# Patient Record
(demographics unavailable — no encounter records)

---

## 2025-04-15 NOTE — PROCEDURE
[TextEntry] : Excision Operative Note   Indications:  Alternative therapies were discussed. Given the size, location, and tumor type we decided that excision offers the best option for treatment.   Preoperative diagnosis: Melanoma pT1a (0.2 mm) Postoperative diagnosis: Same Location: left superior upper back Anesthetic: 1% lidocaine with 1:100,000 epinephrine Antiseptic: Chlorhexidine or Betadine Attending surgeon: Alondra Razo MD Assistant(s): Palmira Moseley RN, Aniyah Bautista MA Estimated blood loss: < 5cc Complications: None Initial lesion size: 1.2 cm x 1.1 cm  Surgical margin: 1.0 cm  Total excision size (always includes surgical margin):  3.2 cm x 3.1 cm  Closure type: intermediate linear layered Length of closure: 6.4 cm  Suture material: 3-0 vicryl, 4-0 chromci  The patient was brought back to the minor surgery operating room. Prior to the procedure the medical record was reviewed by the physician. A pre-procedure checklist in the presence of the patient was reviewed. A surgery " timeout " was observed. The following were confirmed during the surgery timeout: patient name, confirmation of consent, patient position, allergies, type of anesthesia, and antibiotic given (if any, see emr entry for any medications).  The risks of the procedure, including bleeding, infection, nerve damage, possibility of recurrence, failure of the repair, and the certainty of a scar were discussed with the patient. Alternatives to the procedure, as well as their risks and benefits, were also discussed. The patient was offered an opportunity to ask any questions and, after expressing understanding of the proposed procedure and its alternatives, the patient signed the consent form. The patient was placed in appropriate position and the area was prepared and draped in the usual manner. Local anesthesia was obtained with the above mentioned anesthetic. Using a sterile surgical marking pen, an elliptical (or circular) excision was designed around the lesion and along relaxed skin tension lines, if possible, incorporating the margin of normal-appearing skin mentioned above. A full thickness skin incision using a #15 blade Bard-Alvin scalpel was performed and the lesion was excised sharply in the subcutaneous plane.  The specimen was submitted for histopathologic examination.   The defect was moderately undermined in the subcutaneous plane if needed to reduce wound closure tension and allow for easy wound edge eversion.  Hemostasis was maintained with the electrosurgical unit.  Interrupted, inverted, subcuticular buried mattress sutures were placed using the material mentioned above to approximate the dermal edges of the wound. Interrupted and running simple cutaneous sutures were used to further approximate and chema the wound edges.  The final length of the repair is listed in the summary above. A firm pressure dressing was applied over vaseline ointment and Telfa. Verbal postoperative wound care instructions were given and a wound care hand out was dispensed.  The patient was asked to follow up for a wound check as specified. The patient was also told to call us at anytime for signs of wound infection or any other concerns they might have regarding the surgery or the healing process.   The patient tolerated the procedure well and ambulated from the operatory without problem.

## 2025-04-15 NOTE — ASSESSMENT
[FreeTextEntry1] : Melanoma 0.2 mm (pT1a) Left Superior Upper Back --excision with1.0 cm margins performed today, will follow up on final pathology -- intermediate linear layered repair performed -- f/u for wound check PRN -mupirocin 2% BID -- f/u for routine skin exams as previously recommended by His referring dermatologist.   Thank you for this dermatologic surgery referral.   Alondra Razo MD Physician, Dermatology & Dermatologic Surgery [facility]

## 2025-04-15 NOTE — HISTORY OF PRESENT ILLNESS
[FreeTextEntry1] : Melanoma 0.2 cm (pT1a) Left Superior Upper Back [de-identified] : Dermatologic Surgery Consultation  04/15/2025   Referred by: Dr. Thomas  We had the pleasure of seeing your patient in consultation for Dermatologic Surgery.  Mr. STARLA DIGGS is a 69 year old M who presents for evaluation of a recently biopsied melanoma of the left superior upper back. Pathology showed predominanatly in situ however there was focal invasion to 0.2 mm and staged as pT1a. Not sure how long had been there.   Pertinent positives noted below  History of HIV or hepatitis: No Blood thinners: none Antibiotic Prophylaxis: None  Medical implants: None  Social History: no tobacco    The patient's review of systems questionnaire was reviewed. Education needs were identified. There were no barriers to learning.  Dermatologic surgery consultation for melanoma, 0.2 mm, Stage pT1a Left Superior Upper Back  -- I explained the treatment recommendation of excision with 1.0 cm margins according to NCCN guidelines -I advised that final pathologic staging is only possible after evaluation of the complete specimen following excision, and may result in a change to the final stage of the melanoma and further treatment and/or workup recommendations including additional excision as well as discussion of SLNB if indicated according to final tumor stage -I explained that following extirpation there will be a full thickness defect of the involved area. The reconstructive options will be based on the defect size and surrounding tissue laxity of the involved area. Primary closure is only possible for smaller defects. For larger defects, local tissue rearrangement or skin grafting may be necessary. Risks following layered primary closure or local tissue rearrangement include wound dehiscence, contour irregularity, bleeding, infection, and paresthesia (nerve damage including sensory deficit or motor weakness). Risks following skin grafting include wound dehiscence, skin graft nonadherence (partial or complete), contour irregularity, bleeding, infection, paresthesia, and donor site complications. I explained that the patient will need to abstain from physical activity for 1-2 weeks following the surgery, that they would heal with a scar, and also discussed the chances of infection, bleeding, potential sensory or motor nerve damage, and recurrence.  The patient indicated that s/he understood the risks and wished to proceed today -- In particular, for reconstruction we discussed linear layered closure  Thank you for this dermatologic surgery referral. We recommended that the patient follow up with their r referring dermatologist for routine skin exams following surgery.

## 2025-04-15 NOTE — PHYSICAL EXAM
[Alert] : alert [Oriented x 3] : ~L oriented x 3 [Well Nourished] : well nourished [Conjunctiva Non-injected] : conjunctiva non-injected [No Visual Lymphadenopathy] : no visual  lymphadenopathy [No Clubbing] : no clubbing [No Edema] : no edema [No Bromhidrosis] : no bromhidrosis [No Chromhidrosis] : no chromhidrosis [Hair] : Hair [Scalp] : Scalp [Face] : Face [Nose] : Nose [Eyelids] : Eyelids [Ears] : Ears [Lips] : Lips [Neck] : Neck [Nodes] : Nodes [FreeTextEntry3] : -left upper back with 1.2 cm x 1.1 cm pink scar -no cervical or axillary adenopathy